# Patient Record
Sex: MALE | URBAN - METROPOLITAN AREA
[De-identification: names, ages, dates, MRNs, and addresses within clinical notes are randomized per-mention and may not be internally consistent; named-entity substitution may affect disease eponyms.]

---

## 2021-04-28 ENCOUNTER — TELEPHONE (OUTPATIENT)
Dept: BEHAVIORAL HEALTH | Facility: CLINIC | Age: 61
End: 2021-04-28

## 2021-04-28 NOTE — TELEPHONE ENCOUNTER
Patient cleared and ready for behavioral bed placement: Yes    S Pt is a 60 year old male at the [facility] in [facility]     B Pt bib by law enforcement. Pt wife called law enforcement as pt has been hallucinating and talking to people that are not there. Pt has not slept for last two days. Pt went to grab a bow and arrow from his house today and his wife felt threatened today and this is why she called police. Pt has flight of ideas in ed. Pt is anxious in ed and talks about peeing purple. 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 Blood sugar was 353 upon arrival. Pt has a history of diabetic atrophy, hypertension, GERD, and chronic back pain. Pt given 1L of salin and 1mg ativan in ed. Pt labs have resulted WBC 12.9, glucose 328, BUN 36, sodium 135, , covid19 negative, utox + THC, amphetamines, methamphetamines, and tricyclic antidepressants pt denies taking anything, oxycodone (prescribed). CT normal. Pt does ambulates with a cane and is steady. Pt is off all IVs.      A 72hh     R Removed from adult worklist     /85  P 91  SPO2 98  R 20  T 97     - intake requested new glucose reading and 72hh paperwork prior to running pt by a physician 314pm  - 339pm pt declined at [place] 333pm due to acuity of milieu and pt acuity presentation would be to acute for current population [place]